# Patient Record
Sex: FEMALE | URBAN - METROPOLITAN AREA
[De-identification: names, ages, dates, MRNs, and addresses within clinical notes are randomized per-mention and may not be internally consistent; named-entity substitution may affect disease eponyms.]

---

## 2019-10-25 ENCOUNTER — HOSPITAL ENCOUNTER (OUTPATIENT)
Dept: TELEMEDICINE | Facility: HOSPITAL | Age: 29
Discharge: HOME | End: 2019-10-25

## 2019-10-25 DIAGNOSIS — I63.411 CEREBRAL INFARCTION DUE TO EMBOLISM OF RIGHT MIDDLE CEREBRAL ARTERY: ICD-10-CM

## 2019-10-25 PROCEDURE — G0425 INPT/ED TELECONSULT30: HCPCS | Mod: GT,G0,, | Performed by: PSYCHIATRY & NEUROLOGY

## 2019-10-25 PROCEDURE — G0425 PR INPT TELEHEALTH CONSULT 30M: ICD-10-PCS | Mod: GT,G0,, | Performed by: PSYCHIATRY & NEUROLOGY

## 2019-10-25 NOTE — HPI
29 year old. 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 INR 1.5  LSN 1700  CT head subacute R MCA changes despite time of onset noted.  Not IV TPA candidate, potential for thrombectomy

## 2019-10-25 NOTE — CONSULTS
[facility]  Vascular Neurology  Comprehensive Stroke Center  Tele-Consultation Note      Consults    Consulting Provider: CHRISTEL ALVAREZ, CASE E.  Current Providers  No providers found    Patient Location: [facility] Emergency Department  Spoke hospital nurse at bedside with patient assisting consultant.     Patient information was obtained from patient, parent and relative(s).         Assessment/Plan:     STROKE DOCUMENTATION     Acute Stroke Times:   Acute Stroke Times   Last Known Normal Date: 10/25/19  Last Known Normal Time: 1700  Stroke Team Arrival Date: 10/25/19  Stroke Team Arrival Time: 1749  CT Interpretation Time: 1757    NIH Scale:  1a. Level of Consciousness: 0-->Alert, keenly responsive  1b. LOC Questions: 0-->Answers both questions correctly  1c. LOC Commands: 0-->Performs both tasks correctly  2. Best Gaze: 0-->Normal  3. Visual: 0-->No visual loss  4. Facial Palsy: 2-->Partial paralysis (total or near-total paralysis of lower face)  5a. Motor Arm, Left: 2-->Some effort against gravity, limb cannot get to or maintain (if cued) 90 (or 45) degrees, drifts down to bed, but has some effort against gravity  5b. Motor Arm, Right: 0-->No drift, limb holds 90 (or 45) degrees for full 10 secs  6a. Motor Leg, Left: 2-->Some effort against gravity, leg falls to bed by 5 secs, but has some effort against gravity  6b. Motor Leg, Right: 0-->No drift, leg holds 30 degree position for full 5 secs  7. Limb Ataxia: 0-->Absent  8. Sensory: 0-->Normal, no sensory loss  9. Best Language: 0-->No aphasia, normal  10. Dysarthria: 1-->Mild-to-moderate dysarthria, patient slurs at least some words and, at worst, can be understood with some difficulty  11. Extinction and Inattention (formerly Neglect): 0-->No abnormality  Total (NIH Stroke Scale): 7     Modified Héctor    Landenberg Coma Scale:    ABCD2 Score:    [id number] Score:   HAS  -BLED Score:   ICH Score:   Hunt & Verdin Classification:       Diagnoses:   Cerebral infarction due to embolism of right middle cerebral artery  SEE HPI    AcuTe R MCA syndrome with R MCA subacute stroke finding on CT  + R MCA <1 thrombus    Not TPA candidate. Will pursue thrombectomy    Antithrombotics for secondary stroke prevention: Anticoagulants: Warfarin INR adjusted target    Statins for secondary stroke prevention and hyperlipidemia, if present:   Statins: Atorvastatin- 80 mg daily    Aggressive risk factor modification: Smoking     Rehab efforts: The patient has been evaluated by a stroke team provider and the therapy needs have been fully considered based off the presenting complaints and exam findings. The following therapy evaluations are needed: PT evaluate and treat, OT evaluate and treat, SLP evaluate and treat, PM&R evaluate for appropriate placement    Diagnostics ordered/pending: CT scan of head without contrast to asses brain parenchyma, HgbA1C to assess blood glucose levels, Lipid Profile to assess cholesterol levels, TTE to assess cardiac function/status     VTE prophylaxis: None: Reason for No Pharmacological VTE Prophylaxis: Currently on anticoagulation, Mechanical prophylaxis: Place SCDs    BP parameters: Infarct: No intervention, SBP <220            There were no vitals taken for this visit.  Alteplase Eligible?: No  Alteplase Recommendation: Alteplase not recommended due to Unknown/unclear onset  and Hypodensity on CT   Possible Interventional Revascularization Candidate? Yes    Disposition Recommendation: transfer to nearest appropriate facility     Subjective:     History of Present Illness:  29 year old. 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 INR 1.5  LSN 1700  CT head subacute R MCA changes despite time of onset noted.  Not IV TPA candidate, potential for thrombectomy                           Woke up with symptoms?: no    Recent bleeding noted: no  Does the patient take  any Blood Thinners? yes  Medications: Anticoagulants:  coumadin      Past Medical History: stroke and Heart Problems    Past Surgical History: any brain surgery and cerebral aneurysm     Family History: no relevant history    Social History: smoker (active)    Allergies: Allergies have not been reviewed No relevant allergies    Review of Systems   Musculoskeletal: Positive for gait problem.   Neurological: Positive for facial asymmetry, speech difficulty and weakness.   All other systems reviewed and are negative.    Objective:        CT READ: Yes  Abnormal CT early changes.     Physical Exam   Constitutional: She appears well-developed.   HENT:   Head: Normocephalic.   Eyes: EOM are normal.   Cardiovascular: Normal rate.   Pulmonary/Chest: Effort normal.   Abdominal: Soft.   Neurological: A cranial nerve deficit is present. She exhibits abnormal muscle tone.             Recommended the emergency room physician to have a brief discussion with the patient and/or family if available regarding the risks and benefits of treatment, and to briefly document the occurrence of that discussion in his clinical encounter note.     The attending portion of this evaluation, treatment, and documentation was performed per Oneil Pelletier MD via audiovisual.    Billing code:  (time dependent stroke, complex case, unstable patient, hemorrhages, any intervention, some mimics)    · This patient has a very critical neurological condition/illness, with very high morbidity and mortality.  · There is a very high probability for acute neurological change leading to clinical and possibly life-threatening deterioration requiring highest level of physician preparedness for urgent intervention.  · There is possibility that this condition will require treatment with high risk medications as quickly as possible.  · There is also a possibility that the patient may benefit from further, more advance complex therapies (e.g. endovascular  therapy) that will require prompt diagnosis and care.  · Care was coordinated with other physicians involved in the patient's care.  · Radiologic studies and laboratory data were reviewed and interpreted, and plan of care was re-assessed based on the results.  · Diagnosis, treatment options and prognosis may have been discussed with the patient and/or family members or caregiver.  · Further advanced medical management and further evaluation is warranted for his care.      In your opinion, this was a: Tier 1 Van Positive    Consult End Time: 6:18 PM     Oneil Pelletier MD  [facility]  Vascular Neurology   [facility]

## 2019-10-25 NOTE — SUBJECTIVE & OBJECTIVE
Woke up with symptoms?: no    Recent bleeding noted: no  Does the patient take any Blood Thinners? yes  Medications: Anticoagulants:  coumadin      Past Medical History: stroke and Heart Problems    Past Surgical History: any brain surgery and cerebral aneurysm     Family History: no relevant history    Social History: smoker (active)    Allergies: Allergies have not been reviewed No relevant allergies    Review of Systems   Musculoskeletal: Positive for gait problem.   Neurological: Positive for facial asymmetry, speech difficulty and weakness.   All other systems reviewed and are negative.    Objective:        CT READ: Yes  Abnormal CT early changes.     Physical Exam   Constitutional: She appears well-developed.   HENT:   Head: Normocephalic.   Eyes: EOM are normal.   Cardiovascular: Normal rate.   Pulmonary/Chest: Effort normal.   Abdominal: Soft.   Neurological: A cranial nerve deficit is present. She exhibits abnormal muscle tone.

## 2019-10-25 NOTE — ASSESSMENT & PLAN NOTE
SEE HPI    AcuTe R MCA syndrome with R MCA subacute stroke finding on CT  + R MCA <1 thrombus    Not TPA candidate. Will pursue thrombectomy    Antithrombotics for secondary stroke prevention: Anticoagulants: Warfarin INR adjusted target    Statins for secondary stroke prevention and hyperlipidemia, if present:   Statins: Atorvastatin- 80 mg daily    Aggressive risk factor modification: Smoking     Rehab efforts: The patient has been evaluated by a stroke team provider and the therapy needs have been fully considered based off the presenting complaints and exam findings. The following therapy evaluations are needed: PT evaluate and treat, OT evaluate and treat, SLP evaluate and treat, PM&R evaluate for appropriate placement    Diagnostics ordered/pending: CT scan of head without contrast to asses brain parenchyma, HgbA1C to assess blood glucose levels, Lipid Profile to assess cholesterol levels, TTE to assess cardiac function/status     VTE prophylaxis: None: Reason for No Pharmacological VTE Prophylaxis: Currently on anticoagulation, Mechanical prophylaxis: Place SCDs    BP parameters: Infarct: No intervention, SBP <220